# Patient Record
(demographics unavailable — no encounter records)

---

## 2024-11-26 NOTE — HISTORY OF PRESENT ILLNESS
[FreeTextEntry1] : annual  [de-identified] : 53 year old female w/ pmh of recently discovered, incidental finding of right ICA small fusiform aneurysm, presents for her annual visit.  Patient was found to have high BP last year and was advised to check it at home. She says she has found elevated readings  at home.  She is not currently taking any meds.  She feels over all well this past year. Her work is stressful and says it may be driving her Blood pressure up.  she says she had a episode of blurry vision in September, went to ER and that's where they found the aneurysm. She is yet to see the Optho. following up with Neurologist.  All other ROS is negative.

## 2024-11-26 NOTE — HEALTH RISK ASSESSMENT
[Good] : ~his/her~  mood as  good [No] : In the past 12 months have you used drugs other than those required for medical reasons? No [No falls in past year] : Patient reported no falls in the past year [0] : 2) Feeling down, depressed, or hopeless: Not at all (0) [PHQ-2 Negative - No further assessment needed] : PHQ-2 Negative - No further assessment needed [PHQ-9 Negative - No further assessment needed] : PHQ-9 Negative - No further assessment needed [Never] : Never [HIV Test offered] : HIV Test offered [Hepatitis C test offered] : Hepatitis C test offered [None] : None [With Significant Other] : lives with significant other [Employed] : employed [College] : College [] :  [# Of Children ___] : has [unfilled] children [Sexually Active] : sexually active [Feels Safe at Home] : Feels safe at home [Fully functional (bathing, dressing, toileting, transferring, walking, feeding)] : Fully functional (bathing, dressing, toileting, transferring, walking, feeding) [Fully functional (using the telephone, shopping, preparing meals, housekeeping, doing laundry, using] : Fully functional and needs no help or supervision to perform IADLs (using the telephone, shopping, preparing meals, housekeeping, doing laundry, using transportation, managing medications and managing finances) [Reports changes in vision] : Reports changes in vision [Reports normal functional visual acuity (ie: able to read med bottle)] : Reports normal functional visual acuity [Smoke Detector] : smoke detector [Seat Belt] :  uses seat belt [Audit-CScore] : 0 [de-identified] : walking 2x/ week [de-identified] : no  [FSZ1Sgsts] : 0 [Change in mental status noted] : No change in mental status noted [Language] : denies difficulty with language [Behavior] : denies difficulty with behavior [Learning/Retaining New Information] : denies difficulty learning/retaining new information [Handling Complex Tasks] : denies difficulty handling complex tasks [Reasoning] : denies difficulty with reasoning [Spatial Ability and Orientation] : denies difficulty with spatial ability and orientation [High Risk Behavior] : no high risk behavior [Reports changes in hearing] : Reports no changes in hearing [Reports changes in dental health] : Reports no changes in dental health [MammogramDate] : 01/2023 [PapSmearDate] : 10/2022 [BoneDensityComments] : na [ColonoscopyDate] : 2022 [ColonoscopyComments] : 2027 due (5 year) [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

## 2024-11-26 NOTE — PLAN
[FreeTextEntry1] :     53 year old female w/ pmh of recently discovered, incidental finding of right ICA small fusiform aneurysm, presents for her annual visit.  Patient was found to have high BP last year and was advised to check it at home. She says she has found elevated readings  at home.  She is not currently taking any meds.  She feels over all well this past year. Her work is stressful and says it may be driving her Blood pressure up.  she says she had a episode of blurry vision in September, went to ER and that's where they found the aneurysm. She is yet to see the Optho. following up with Neurologist.    Annual visit >>basic blood work, hiv/ hep panel, urinalysis  Uncontrolled HTN -Reccomend to start Lisinopril and check BP twice a day.  -patient says she prefers to check her Bp twice a day for one week and then will start her meds. she wants to make sure her BP is high.   Blurry vision >>one episode, resolved >>will follow up with optho  right ICA small Fusiform Aneurysm >> seeing vascular surgery tomorrow 11/27/24 for reccomendations   Patient to return to clinic in one year for annual or earlier if acuity.

## 2024-11-26 NOTE — HISTORY OF PRESENT ILLNESS
[FreeTextEntry1] : annual  [de-identified] : 53 year old female w/ pmh of recently discovered, incidental finding of right ICA small fusiform aneurysm, presents for her annual visit.  Patient was found to have high BP last year and was advised to check it at home. She says she has found elevated readings  at home.  She is not currently taking any meds.  She feels over all well this past year. Her work is stressful and says it may be driving her Blood pressure up.  she says she had a episode of blurry vision in September, went to ER and that's where they found the aneurysm. She is yet to see the Optho. following up with Neurologist.  All other ROS is negative.

## 2024-11-26 NOTE — HEALTH RISK ASSESSMENT
[Good] : ~his/her~  mood as  good [No] : In the past 12 months have you used drugs other than those required for medical reasons? No [No falls in past year] : Patient reported no falls in the past year [0] : 2) Feeling down, depressed, or hopeless: Not at all (0) [PHQ-2 Negative - No further assessment needed] : PHQ-2 Negative - No further assessment needed [PHQ-9 Negative - No further assessment needed] : PHQ-9 Negative - No further assessment needed [Never] : Never [HIV Test offered] : HIV Test offered [Hepatitis C test offered] : Hepatitis C test offered [None] : None [With Significant Other] : lives with significant other [Employed] : employed [College] : College [] :  [# Of Children ___] : has [unfilled] children [Sexually Active] : sexually active [Feels Safe at Home] : Feels safe at home [Fully functional (bathing, dressing, toileting, transferring, walking, feeding)] : Fully functional (bathing, dressing, toileting, transferring, walking, feeding) [Fully functional (using the telephone, shopping, preparing meals, housekeeping, doing laundry, using] : Fully functional and needs no help or supervision to perform IADLs (using the telephone, shopping, preparing meals, housekeeping, doing laundry, using transportation, managing medications and managing finances) [Reports changes in vision] : Reports changes in vision [Reports normal functional visual acuity (ie: able to read med bottle)] : Reports normal functional visual acuity [Smoke Detector] : smoke detector [Seat Belt] :  uses seat belt [Audit-CScore] : 0 [de-identified] : walking 2x/ week [de-identified] : no  [[id number]] : 0 [Change in mental status noted] : No change in mental status noted [Language] : denies difficulty with language [Behavior] : denies difficulty with behavior [Learning/Retaining New Information] : denies difficulty learning/retaining new information [Handling Complex Tasks] : denies difficulty handling complex tasks [Reasoning] : denies difficulty with reasoning [Spatial Ability and Orientation] : denies difficulty with spatial ability and orientation [High Risk Behavior] : no high risk behavior [Reports changes in hearing] : Reports no changes in hearing [Reports changes in dental health] : Reports no changes in dental health [MammogramDate] : 01/2023 [PapSmearDate] : 10/2022 [BoneDensityComments] : na [ColonoscopyDate] : 2022 [ColonoscopyComments] : 2027 due (5 year) [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

## 2024-11-27 NOTE — HISTORY OF PRESENT ILLNESS
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 Presented to [facility] ED on 9/27/24 with visual disturbances, changes in vision, blurriness in both eyes. No headaches or other neurologic associated with it. Ophthalmology consulted. CTA head/neck 9/27/24, MRA head/neck 10/30/24 showed mild fusiform aneurysmal dilatation of the distal left ICA. Referred by Dr. Mendez for neurosurgical evaluation.

## 2024-11-27 NOTE — ASSESSMENT
[FreeTextEntry1] : IMPRESSION: 53F RHD with PMH of asthma, chronic allergic rhinitis, visual impairment in L eye since childhood. Presented to [facility] ED on 9/27/24 with b/l blurry vision, resolved on its own. Ophtho consulted. No headaches associated with it. CTA head/neck 9/27/24, MRA head/neck 10/30/24 showed mild fusiform aneurysmal dilatation of the distal left ICA.   To my interpretation, there is tortuosity of the upper cervical ICAs bilaterally, L>R. On the left there is a loop just before the artery enters the skull base, and within these loops there is some very mild dilatation. There are no aneurysms intracranially. It's borderline to consider this aneurysmal. There is virtually zero risk of rupture at this point, and obviously this is not intracranial and thus there is no risk of SAH. It's very unlikely that this finding is responsible for an episode of b/l blurry vision. Explained to patient that if this progresses over time and there is worsening segmental narrowing/dilatation, there could theoretically be a stroke risk. Blood pressure management +/- antiplatelets, and avoidance of smoking are the best preventive strategies.     PLAN: No neurosurgical intervention Continue follow up with vascular neurology, Dr. Mendez for routine follow up imaging Follow up with neurosurgery should there be substantial progression or other concerns Follow up with PCP for BP management

## 2024-11-27 NOTE — HISTORY OF PRESENT ILLNESS
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 Presented to [facility] ED on 9/27/24 with visual disturbances, changes in vision, blurriness in both eyes. No headaches or other neurologic associated with it. Ophthalmology consulted. CTA head/neck 9/27/24, MRA head/neck 10/30/24 showed mild fusiform aneurysmal dilatation of the distal left ICA. Referred by Dr. Mendez for neurosurgical evaluation.

## 2024-11-27 NOTE — ASSESSMENT
[FreeTextEntry1] : IMPRESSION: 53F RHD with PMH of asthma, chronic allergic rhinitis, visual impairment in L eye since childhood. Presented to [facility] ED on 9/27/24 with b/l blurry vision, resolved on its own. Ophtho consulted. No headaches associated with it. CTA head/neck 9/27/24, MRA head/neck 10/30/24 showed mild fusiform aneurysmal dilatation of the distal left ICA.   To my interpretation, there is tortuosity of the upper cervical ICAs bilaterally, L>R. On the left there is a loop just before the artery enters the skull base, and within these loops there is some very mild dilatation. There are no aneurysms intracranially. It's borderline to consider this aneurysmal. There is virtually zero risk of rupture at this point, and obviously this is not intracranial and thus there is no risk of SAH. It's very unlikely that this finding is responsible for an episode of b/l blurry vision. Explained to patient that if this progresses over time and there is worsening segmental narrowing/dilatation, there could theoretically be a stroke risk. Blood pressure management +/- antiplatelets, and avoidance of smoking are the best preventive strategies.     PLAN: No neurosurgical intervention Continue follow up with vascular neurology, Dr. Mendez for routine follow up imaging Follow up with neurosurgery should there be substantial progression or other concerns Follow up with PCP for BP management

## 2025-06-06 NOTE — HISTORY OF PRESENT ILLNESS
[FreeTextEntry1] : Arm pain and numbness [de-identified] : 54-year-old female presents for right arm numbness and pain.  Pain ongoing for 3 weeks.  No trauma to the right arm or neck area. Patient says the back of her neck and the right shoulder are achy and constantly hurt.  Past 3 weeks there has been numbness and throbbing down the right arm.  She went to her OB/GYN to see if anything was wrong with her right breast they did an ultrasound of the underarm and breast which was negative.  She denies any fevers chills.  All other ROS is negative.

## 2025-06-06 NOTE — PLAN
[FreeTextEntry1] :      54-year-old female presents for right arm numbness and pain.  Pain ongoing for 3 weeks.  No trauma to the right arm or neck area. Patient says the back of her neck and the right shoulder are achy and constantly hurt.  Past 3 weeks there has been numbness and throbbing down the right arm.  She went to her OB/GYN to see if anything was wrong with her right breast they did an ultrasound of the underarm and breast which was negative.  She denies any fevers chills.  All other ROS is negative.   Right arm pain\numbness >> Will need to rule out neurological causes of her right arm numbness tingling and arm pain >> CT cervical spine ordered